# Patient Record
(demographics unavailable — no encounter records)

---

## 2025-01-15 NOTE — HISTORY OF PRESENT ILLNESS
[FreeTextEntry1] : 75-year-old male with a past medical history of cardiomyopathy status post ICD COPD comes in accompanied by his wife for evaluation of syncope.  Patient had 2 syncopal episodes over a 4-day span both preceded by a prodrome of feeling dizzy followed by syncope.  Denies any chest pain shortness of breath PND orthopnea or palpitations.

## 2025-01-15 NOTE — DISCUSSION/SUMMARY
[FreeTextEntry1] : 1.  Syncope: Will obtain an EKG blood work to rule out electrolyte abnormalities and will interrogate his ICD to rule out rescue shock from arrhythmia. [EKG obtained to assist in diagnosis and management of assessed problem(s)] : EKG obtained to assist in diagnosis and management of assessed problem(s)

## 2025-02-24 NOTE — DISCUSSION/SUMMARY
[Procedure Low Risk] : the procedure risk is low [Patient Intermediate Risk] : the patient is an intermediate risk [Optimized for Surgery] : the patient is optimized for surgery [As per surgery] : as per surgery [Continue] : Continue medications as currently directed [FreeTextEntry1] : 1.  Preop clearance for elective cataract surgery: Patient is low to intermediate risk for low risk procedure does 2-3 METS of daily to be without chest pain shortness of breath PND orthopnea.  EKG reviewed sinus rhythm with occasional paced beats.  Advised to continue current medication. 2.  Cardiomyopathy: ICD interrogated December 2024 3.  COPD: Continue current meds and follow-up with pulmonary medicine.

## 2025-02-24 NOTE — PHYSICAL EXAM
[General Appearance - Well Developed] : well developed [Normal Appearance] : normal appearance [Well Groomed] : well groomed [General Appearance - Well Nourished] : well nourished [No Deformities] : no deformities [General Appearance - In No Acute Distress] : no acute distress [Normal Oral Mucosa] : normal oral mucosa [No Oral Pallor] : no oral pallor [No Oral Cyanosis] : no oral cyanosis [Normal Jugular Venous A Waves Present] : normal jugular venous A waves present [Normal Jugular Venous V Waves Present] : normal jugular venous V waves present [No Jugular Venous Emerson A Waves] : no jugular venous emerson A waves [Respiration, Rhythm And Depth] : normal respiratory rhythm and effort [Exaggerated Use Of Accessory Muscles For Inspiration] : no accessory muscle use [Auscultation Breath Sounds / Voice Sounds] : lungs were clear to auscultation bilaterally [FreeTextEntry1] : poor air entry, chronic [Heart Rate And Rhythm] : heart rate and rhythm were normal [Heart Sounds] : normal S1 and S2 [Arterial Pulses Normal] : the arterial pulses were normal [Edema] : no peripheral edema present [Bowel Sounds] : normal bowel sounds [Abdomen Soft] : soft [Abdomen Tenderness] : non-tender [Abdomen Mass (___ Cm)] : no abdominal mass palpated [Abnormal Walk] : normal gait [Gait - Sufficient For Exercise Testing] : the gait was sufficient for exercise testing [Nail Clubbing] : no clubbing of the fingernails [Cyanosis, Localized] : no localized cyanosis [Petechial Hemorrhages (___cm)] : no petechial hemorrhages [] : no ischemic changes [Oriented To Time, Place, And Person] : oriented to person, place, and time [Impaired Insight] : insight and judgment were intact [Affect] : the affect was normal [Mood] : the mood was normal [No Anxiety] : not feeling anxious

## 2025-02-24 NOTE — HISTORY OF PRESENT ILLNESS
[Preoperative Visit] : for a medical evaluation prior to surgery [Scheduled Procedure ___] : a [unfilled] [Good] : Good [Chest Pain] : no chest pain [Dyspnea] : dyspnea [Lower Extremity Swelling] : no lower extremity swelling [FreeTextEntry1] : 75-year-old male with a past medical history of a cardiomyopathy status post ICD COPD comes in for preop clearance for elective cataract surgery and accompanied by his wife.  Overall, he is at baseline does 2-3 METS of daily activity without chest pain worsening shortness of breath PND orthopnea.

## 2025-05-14 NOTE — HISTORY OF PRESENT ILLNESS
[FreeTextEntry1] : 75-year-old male with a past medical history of nonischemic cardiomyopathy status post ICD comes in accompanied by his wife for follow-up.  Overall, feels well denies any chest pain shortness of breath PND orthopnea.

## 2025-05-14 NOTE — DISCUSSION/SUMMARY
[FreeTextEntry1] : 1.  Nonischemic cardiomyopathy: EKG reviewed sinus rhythm unchanged from prior tracings.  Continue current medication. 2.  COPD: Continue follow-up with pulmonary.  RTC 6m [EKG obtained to assist in diagnosis and management of assessed problem(s)] : EKG obtained to assist in diagnosis and management of assessed problem(s)